# Patient Record
Sex: MALE | Race: WHITE | URBAN - METROPOLITAN AREA
[De-identification: names, ages, dates, MRNs, and addresses within clinical notes are randomized per-mention and may not be internally consistent; named-entity substitution may affect disease eponyms.]

---

## 2018-03-13 ENCOUNTER — OFFICE VISIT (OUTPATIENT)
Dept: PHYSICAL MEDICINE AND REHAB | Age: 63
End: 2018-03-13

## 2018-03-13 DIAGNOSIS — M79.671 FOOT PAIN, BILATERAL: ICD-10-CM

## 2018-03-13 DIAGNOSIS — M54.17 LUMBOSACRAL RADICULOPATHY AT L5: Primary | ICD-10-CM

## 2018-03-13 DIAGNOSIS — R20.2 PARESTHESIA OF BOTH FEET: ICD-10-CM

## 2018-03-13 DIAGNOSIS — M54.17 LUMBOSACRAL RADICULOPATHY AT S1: ICD-10-CM

## 2018-03-13 DIAGNOSIS — M79.672 FOOT PAIN, BILATERAL: ICD-10-CM

## 2018-03-13 PROCEDURE — 95886 MUSC TEST DONE W/N TEST COMP: CPT | Performed by: PHYSICAL MEDICINE & REHABILITATION

## 2018-03-13 PROCEDURE — 95911 NRV CNDJ TEST 9-10 STUDIES: CPT | Performed by: PHYSICAL MEDICINE & REHABILITATION

## 2018-03-13 NOTE — PROGRESS NOTES
EMG REPORT     CHIEF COMPLAINT: Stinging and numbness of the balls of the feet. HISTORY OF PRESENT ILLNESS: 66 y.o. R hand dominant male with about 2 years of persistent bilateral plantar numbness with tingling and some stinging, especially after he has been up on his feet. He is a distance runner and denied tripping over his feet or suffering any deterioration in running distances. He denied any limb discoloration or rashes. He rated the pain severity as 3-4/10. No similar symptoms in his hands. Occasional LE cramping. No h/o thyroid disorder, but he has been considered diabetic. PHYSICAL EXAMINATION: Alert. Normal lumbar lordosis w/o paraspinal muscular tenderness to light palpation. Normal hip and knee PROM. Neg SLR. 1-2+/= KJ's. Trace/= AJ's. Normal LE MMT. No atrophy, tremor or clonus. Hypesthesias with brushing th soles of his feet. NERVE CONDUCTION STUDIES:     MOTOR         LATENCY NORMAL AMPLITUDE DISTANCE COND. MAURICIO. R  PERONEAL    < 6.2 msec  8 cm    L  PERONEAL 4.3 < 6.2 msec 6.4/6.6 8 cm 45/47   RIGHT  TIBIAL  < 6.2 msec  8 cm    LEFT TIBIAL 5.2 < 6.2 msec 4.5 8 cm 51   R TIB H REFLEX 34.0 < 50 msec      L TIB H REFLEX 32.9 < 50 msec         SENSORY  ANTIDROMIC        LATENCY NORMAL AMPLITUDE DISTANCE   R SUP PERONEAL  < 3.6 msec  10 cm   L SUP PERONEAL Absent < 3.6 msec  10 cm   RIGHT  SURAL 2.7 < 4.0 msec 8 14 cm   LEFT  SURAL 3.3 < 4.0 msec 8 14 cm       Left medial plantar sensory: Absent. Left lateral plantar sensory: Absent.       NEEDLE EMG:      RIGHT   LEFT     Insertional Activity Spontaneous  Activity Volutional  MUAP's Insertional Activity Spontaneous  Activity Volutional  MUAP's   Lumbar paraspinals Increased + Polys Increased + Polys   Glut Med Normal None Normal Normal None Normal   Rect fem Normal None Normal Normal None Normal   Vast Med Normal None Normal Normal None Normal   Ant Tibialis Increased Occ Dec #, Polys, Larger Increased Occ Dec #, Polys   EHL \" \" \" \" \" \"

## 2018-04-18 ENCOUNTER — HOSPITAL ENCOUNTER (OUTPATIENT)
Dept: GENERAL RADIOLOGY | Age: 63
Discharge: HOME | End: 2018-04-18
Attending: FAMILY MEDICINE | Admitting: FAMILY MEDICINE

## 2018-04-18 DIAGNOSIS — M79.661 RIGHT CALF PAIN: ICD-10-CM
